# Patient Record
Sex: FEMALE | Race: WHITE | Employment: OTHER | URBAN - METROPOLITAN AREA
[De-identification: names, ages, dates, MRNs, and addresses within clinical notes are randomized per-mention and may not be internally consistent; named-entity substitution may affect disease eponyms.]

---

## 2018-10-11 ENCOUNTER — HOSPITAL ENCOUNTER (OUTPATIENT)
Dept: CARDIOLOGY | Age: 83
Discharge: HOME | End: 2018-10-11
Payer: MEDICARE

## 2018-10-11 ENCOUNTER — OFFICE VISIT (OUTPATIENT)
Dept: VASCULAR SURGERY | Age: 83
End: 2018-10-11
Payer: MEDICARE

## 2018-10-11 DIAGNOSIS — I73.9 PVD (PERIPHERAL VASCULAR DISEASE) WITH CLAUDICATION (HCC): Primary | ICD-10-CM

## 2018-10-11 PROCEDURE — 99213 OFFICE O/P EST LOW 20 MIN: CPT | Performed by: SURGERY

## 2018-10-11 PROCEDURE — 93923 UPR/LXTR ART STDY 3+ LVLS: CPT

## 2018-10-11 RX ORDER — LOSARTAN POTASSIUM 100 MG/1
100 TABLET ORAL DAILY
COMMUNITY

## 2018-10-11 RX ORDER — TORSEMIDE 20 MG/1
20 TABLET ORAL DAILY
Refills: 1 | COMMUNITY
Start: 2018-09-25

## 2018-10-11 RX ORDER — POTASSIUM CHLORIDE 750 MG/1
10 TABLET, EXTENDED RELEASE ORAL DAILY
COMMUNITY

## 2018-10-11 RX ORDER — LOPERAMIDE HYDROCHLORIDE 2 MG/1
2 CAPSULE ORAL 4 TIMES DAILY PRN
COMMUNITY

## 2018-10-11 RX ORDER — LORATADINE 10 MG/1
10 TABLET ORAL DAILY
COMMUNITY

## 2019-04-11 ENCOUNTER — APPOINTMENT (OUTPATIENT)
Dept: MRI IMAGING | Age: 84
End: 2019-04-11
Payer: MEDICARE

## 2019-04-11 ENCOUNTER — HOSPITAL ENCOUNTER (OUTPATIENT)
Age: 84
Discharge: HOME | End: 2019-04-11
Payer: MEDICARE

## 2019-04-11 ENCOUNTER — HOSPITAL ENCOUNTER (EMERGENCY)
Age: 84
Discharge: ANOTHER HOSPITAL | End: 2019-04-11
Attending: EMERGENCY MEDICINE
Payer: MEDICARE

## 2019-04-11 ENCOUNTER — HOSPITAL ENCOUNTER (INPATIENT)
Age: 84
LOS: 1 days | Discharge: OTHER INSTITUTION | DRG: 093 | End: 2019-04-12
Attending: FAMILY MEDICINE | Admitting: FAMILY MEDICINE
Payer: MEDICARE

## 2019-04-11 VITALS
WEIGHT: 140 LBS | DIASTOLIC BLOOD PRESSURE: 86 MMHG | OXYGEN SATURATION: 97 % | BODY MASS INDEX: 22.5 KG/M2 | TEMPERATURE: 98.2 F | HEIGHT: 66 IN | HEART RATE: 78 BPM | SYSTOLIC BLOOD PRESSURE: 141 MMHG | RESPIRATION RATE: 18 BRPM

## 2019-04-11 DIAGNOSIS — I67.1 BRAIN ANEURYSM: Primary | ICD-10-CM

## 2019-04-11 PROBLEM — I72.9 ANEURYSM (HCC): Status: ACTIVE | Noted: 2019-04-11

## 2019-04-11 LAB
ALBUMIN SERPL-MCNC: 4 G/DL (ref 3.5–5.2)
ALP BLD-CCNC: 86 U/L (ref 35–104)
ALT SERPL-CCNC: 13 U/L (ref 0–32)
ANION GAP SERPL CALCULATED.3IONS-SCNC: 10 MMOL/L (ref 7–16)
AST SERPL-CCNC: 19 U/L (ref 0–31)
BASOPHILS ABSOLUTE: 0.08 E9/L (ref 0–0.2)
BASOPHILS RELATIVE PERCENT: 1.1 % (ref 0–2)
BILIRUB SERPL-MCNC: 0.8 MG/DL (ref 0–1.2)
BUN BLDV-MCNC: 23 MG/DL (ref 8–23)
CALCIUM SERPL-MCNC: 9.5 MG/DL (ref 8.6–10.2)
CHLORIDE BLD-SCNC: 99 MMOL/L (ref 98–107)
CO2: 29 MMOL/L (ref 22–29)
CREAT SERPL-MCNC: 0.8 MG/DL (ref 0.5–1)
EOSINOPHILS ABSOLUTE: 0.27 E9/L (ref 0.05–0.5)
EOSINOPHILS RELATIVE PERCENT: 3.7 % (ref 0–6)
GFR AFRICAN AMERICAN: >60
GFR NON-AFRICAN AMERICAN: >60 ML/MIN/1.73
GLUCOSE BLD-MCNC: 95 MG/DL (ref 74–99)
HCT VFR BLD CALC: 43.7 % (ref 34–48)
HEMOGLOBIN: 14.8 G/DL (ref 11.5–15.5)
IMMATURE GRANULOCYTES #: 0.06 E9/L
IMMATURE GRANULOCYTES %: 0.8 % (ref 0–5)
LYMPHOCYTES ABSOLUTE: 1.88 E9/L (ref 1.5–4)
LYMPHOCYTES RELATIVE PERCENT: 25.6 % (ref 20–42)
MCH RBC QN AUTO: 32.3 PG (ref 26–35)
MCHC RBC AUTO-ENTMCNC: 33.9 % (ref 32–34.5)
MCV RBC AUTO: 95.4 FL (ref 80–99.9)
MONOCYTES ABSOLUTE: 0.65 E9/L (ref 0.1–0.95)
MONOCYTES RELATIVE PERCENT: 8.9 % (ref 2–12)
NEUTROPHILS ABSOLUTE: 4.39 E9/L (ref 1.8–7.3)
NEUTROPHILS RELATIVE PERCENT: 59.9 % (ref 43–80)
PDW BLD-RTO: 16.2 FL (ref 11.5–15)
PLATELET # BLD: 230 E9/L (ref 130–450)
PMV BLD AUTO: 9.8 FL (ref 7–12)
POTASSIUM SERPL-SCNC: 3.9 MMOL/L (ref 3.5–5)
RBC # BLD: 4.58 E12/L (ref 3.5–5.5)
SODIUM BLD-SCNC: 138 MMOL/L (ref 132–146)
TOTAL PROTEIN: 6.3 G/DL (ref 6.4–8.3)
WBC # BLD: 7.3 E9/L (ref 4.5–11.5)

## 2019-04-11 PROCEDURE — 70544 MR ANGIOGRAPHY HEAD W/O DYE: CPT

## 2019-04-11 PROCEDURE — 85025 COMPLETE CBC W/AUTO DIFF WBC: CPT

## 2019-04-11 PROCEDURE — A0425 GROUND MILEAGE: HCPCS

## 2019-04-11 PROCEDURE — 70551 MRI BRAIN STEM W/O DYE: CPT

## 2019-04-11 PROCEDURE — 1200000000 HC SEMI PRIVATE

## 2019-04-11 PROCEDURE — 99285 EMERGENCY DEPT VISIT HI MDM: CPT

## 2019-04-11 PROCEDURE — G0378 HOSPITAL OBSERVATION PER HR: HCPCS

## 2019-04-11 PROCEDURE — 80053 COMPREHEN METABOLIC PANEL: CPT

## 2019-04-11 PROCEDURE — A0428 BLS: HCPCS

## 2019-04-11 NOTE — ED PROVIDER NOTES
Department of Emergency Medicine   ED  Provider Note  Admit Date/RoomTime: 4/11/2019  4:23 PM  ED Room: [place] [address]          History of Present Illness:  4/11/19, Time: 6:02 PM         Milana Abreu is a 80 y.o. female presenting to the ED for change in vision, beginning a few days ago. The complaint has been persistent, moderate in severity, and worsened by nothing. Patient comes to the ED from her eye doctor by EMS. EMS reports that pt's eye doctor saw that pt may have a cranial nerve palsy. Sent to the ED for possible CVA. Patient family reported that pt was complaining of double vision and could not focus with her right eye for a few days. States that pt does not walk much due to chronic trouble. Pt states that she has been bumping into things. Reports that they made pt an appointment with her eye doctor, which was today. Report that pt has a hx of Alzheimer, CHF, dementia, hypertension, and PVD. Unable to obtain a complete HPI due to this patient's dementia. Lives in an assistant living facility. Review of Systems:   Unable to obtain a complete ROS due to the patients dementia.       --------------------------------------------- PAST HISTORY ---------------------------------------------  Past Medical History:  has a past medical history of Alzheimer disease, Baker's cyst, Cancer ([facility] CHF (congestive heart failure) ([facility] Dementia, Fracture, toe, Hypertension, and PVD [facility]peripheral vascular disease) [facility] Past Surgical History:  has a past surgical history that includes Total hip arthroplasty; joint replacement; Colon surgery; Total knee arthroplasty; and Colon surgery. Social History:  reports that she quit smoking about a year ago. Her smoking use included cigarettes. She smoked 1.00 pack per day. She has never used smokeless tobacco. She reports that she does not drink alcohol or use drugs. Family History: family history is not on file.      The patients home medications have been reviewed. Allergies: Patient has no known allergies. ---------------------------------------------------PHYSICAL EXAM--------------------------------------      Constitutional/General: Alert and oriented x2, elderly. 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 Mouth: Oropharynx clear, handling secretions  Neck: Supple, full ROM  Respiratory: Lungs clear to auscultation bilaterally, no wheezes, rales, or rhonchi. Not in respiratory distress  Cardiovascular:  Regular rate. Regular rhythm. No murmurs, gallops, or rubs. 2+ distal pulses  Chest: No chest wall tenderness  GI:  Abdomen Soft, Non tender, Non distended. Musculoskeletal: Moves all extremities x 4. Integument: skin warm and dry. Neurologic: Patient is alert. Full neurological exam limited by hx of dementia. NIH is 1. NIH Stroke Scale/Score at time of initial evaluation:  1A: Level of Consciousness 0 - alert; keenly responsive   1B: Ask Month and Age 0 - answers both questions correctly   1C:  Tell Patient To Open and Close Eyes, then Hand  Squeeze 0 - performs both tasks correctly   2: Test Horizontal Extraocular Movements 1 - partial gaze palsy   3: Test Visual Fields 0 - no visual loss   4: Test Facial Palsy 0 - normal symmetric movement   5A: Test Left Arm Motor Drift 0 - no drift, limb holds 90 (or 45) degrees for full 10 seconds   5B: Test Right Arm Motor Drift 0 - no drift, limb holds 90 (or 45) degrees for full 10 seconds   6A: Test Left Leg Motor Drift 0 - no drift; leg holds 30 degree position for full 5 seconds   6B: Test Right Leg Motor Drift 0 - no drift; leg holds 30 degree position for full 5 seconds   7: Test Limb Ataxia   (FNF/Heel-Shin) 0 - absent   8: Test Sensation 0 - normal; no sensory loss   9: Test Language/Aphasia 0 - no aphasia, normal   10: Test Dysarthria 0 - normal   11: Test Extinction/Inattention 0 - no abnormality   Total Score: 1 re-evaluation prior to disposition  This patient has remained hemodynamically stable during their ED course. Re-Evaluations:           Time 6:43 PM  Patient is stable. Upon re-examination, patients symptoms show no change. Patient is resting and stable. Time 7:21 PM  Patient is stable. Discussed results with family and patient. Upon re-examination, patient is resting, NAD. The emergency provider has shared the specific reasons for transfer and admission, and has shared results of today's workup. The patient and family are agreeable for transfer and admission. Consultations:             Time: 7:47 PM.   Spoke with Dr. Giselle Baker (Neurosurgery). Discussed case. They will provide consultation and wants her to go to a general floor. Time: 8:08 PM.   Spoke with Dr. Meenu Anthony (Medicine). Discussed case. They will admit this patient and will provide consultation. Counseling: The emergency provider has spoken with the patient and family members and discussed todays results, in addition to providing specific details for the plan of care and counseling regarding the diagnosis and prognosis. Questions are answered at this time and they are agreeable with the plan.       --------------------------------- IMPRESSION AND DISPOSITION ---------------------------------    IMPRESSION  1. Brain aneurysm Worsening       DISPOSITION  Disposition: Transfer to [address] [place] [address]   Patient condition is stable    SCRIBE ATTESTATION  4/11/19, 6:02 PM.    Portions of this note were prepared by Chepe Dawson acting as Scribe for Selene Kevin MD.    Selene Kevin MD:  The scribe's documentation has been prepared under my direction and personally reviewed by me in its entirety. I confirm that the note above accurately reflects all work, treatment, procedures, and medical decision making performed by me.              Selene Kevin MD  04/16/19 0118

## 2019-04-11 NOTE — ED NOTES
Bed:  HALL-08  Expected date:   Expected time:   Means of arrival:   Comments:     Marquise Nicolas RN  04/11/19 2141

## 2019-04-11 NOTE — ED NOTES
[address] called and records requested since pt came from eye drs.  Facility stated that they will fax tem over      Shahid Cordero RN  04/11/19 2001

## 2019-04-11 NOTE — PROGRESS NOTES
Dr. Richie Wills requested for patient to be transferred to [facility] the Access center @ 1928pm.  Patient Diagnosis: Brain Aneurysm. Dr. Richie Wills spoke with dr. Leigh Ann Peterson (Neurosurgery) @ 1946pm.  Called the Access center @ 1955pm for follow up on call. Will re page Dr. Dangelo Vela.

## 2019-04-12 ENCOUNTER — APPOINTMENT (OUTPATIENT)
Dept: CT IMAGING | Age: 84
DRG: 093 | End: 2019-04-12
Attending: FAMILY MEDICINE
Payer: MEDICARE

## 2019-04-12 VITALS
OXYGEN SATURATION: 96 % | TEMPERATURE: 96.3 F | WEIGHT: 150 LBS | HEIGHT: 69 IN | DIASTOLIC BLOOD PRESSURE: 88 MMHG | HEART RATE: 85 BPM | BODY MASS INDEX: 22.22 KG/M2 | RESPIRATION RATE: 16 BRPM | SYSTOLIC BLOOD PRESSURE: 131 MMHG

## 2019-04-12 PROCEDURE — 2580000003 HC RX 258: Performed by: RADIOLOGY

## 2019-04-12 PROCEDURE — G0378 HOSPITAL OBSERVATION PER HR: HCPCS

## 2019-04-12 PROCEDURE — 70496 CT ANGIOGRAPHY HEAD: CPT

## 2019-04-12 PROCEDURE — 6370000000 HC RX 637 (ALT 250 FOR IP): Performed by: FAMILY MEDICINE

## 2019-04-12 PROCEDURE — 99222 1ST HOSP IP/OBS MODERATE 55: CPT | Performed by: NEUROLOGICAL SURGERY

## 2019-04-12 PROCEDURE — 6360000004 HC RX CONTRAST MEDICATION: Performed by: RADIOLOGY

## 2019-04-12 PROCEDURE — 99223 1ST HOSP IP/OBS HIGH 75: CPT | Performed by: PSYCHIATRY & NEUROLOGY

## 2019-04-12 RX ORDER — MEMANTINE HYDROCHLORIDE 5 MG/1
5 TABLET ORAL 2 TIMES DAILY
Status: DISCONTINUED | OUTPATIENT
Start: 2019-04-12 | End: 2019-04-12 | Stop reason: HOSPADM

## 2019-04-12 RX ORDER — CHOLECALCIFEROL (VITAMIN D3) 50 MCG
2000 TABLET ORAL DAILY
Status: DISCONTINUED | OUTPATIENT
Start: 2019-04-12 | End: 2019-04-12 | Stop reason: HOSPADM

## 2019-04-12 RX ORDER — DONEPEZIL HYDROCHLORIDE 5 MG/1
5 TABLET, FILM COATED ORAL NIGHTLY
Status: DISCONTINUED | OUTPATIENT
Start: 2019-04-12 | End: 2019-04-12 | Stop reason: HOSPADM

## 2019-04-12 RX ORDER — TORSEMIDE 20 MG/1
20 TABLET ORAL DAILY
Status: DISCONTINUED | OUTPATIENT
Start: 2019-04-12 | End: 2019-04-12 | Stop reason: HOSPADM

## 2019-04-12 RX ORDER — SODIUM CHLORIDE 0.9 % (FLUSH) 0.9 %
10 SYRINGE (ML) INJECTION ONCE
Status: COMPLETED | OUTPATIENT
Start: 2019-04-12 | End: 2019-04-12

## 2019-04-12 RX ORDER — ASPIRIN 81 MG/1
81 TABLET ORAL DAILY
Status: DISCONTINUED | OUTPATIENT
Start: 2019-04-12 | End: 2019-04-12 | Stop reason: HOSPADM

## 2019-04-12 RX ORDER — LOSARTAN POTASSIUM AND HYDROCHLOROTHIAZIDE 12.5; 5 MG/1; MG/1
1 TABLET ORAL DAILY
Status: DISCONTINUED | OUTPATIENT
Start: 2019-04-12 | End: 2019-04-12 | Stop reason: HOSPADM

## 2019-04-12 RX ORDER — LOSARTAN POTASSIUM 50 MG/1
100 TABLET ORAL DAILY
Status: DISCONTINUED | OUTPATIENT
Start: 2019-04-12 | End: 2019-04-12 | Stop reason: HOSPADM

## 2019-04-12 RX ORDER — POTASSIUM CHLORIDE 750 MG/1
10 TABLET, EXTENDED RELEASE ORAL DAILY
Status: DISCONTINUED | OUTPATIENT
Start: 2019-04-12 | End: 2019-04-12 | Stop reason: HOSPADM

## 2019-04-12 RX ORDER — OXYCODONE HYDROCHLORIDE AND ACETAMINOPHEN 5; 325 MG/1; MG/1
1 TABLET ORAL EVERY 4 HOURS PRN
Status: DISCONTINUED | OUTPATIENT
Start: 2019-04-12 | End: 2019-04-12 | Stop reason: HOSPADM

## 2019-04-12 RX ADMIN — MEMANTINE HYDROCHLORIDE 5 MG: 5 TABLET, FILM COATED ORAL at 09:06

## 2019-04-12 RX ADMIN — Medication 2000 UNITS: at 09:06

## 2019-04-12 RX ADMIN — TORSEMIDE 20 MG: 20 TABLET ORAL at 09:06

## 2019-04-12 RX ADMIN — OXYCODONE HYDROCHLORIDE AND ACETAMINOPHEN 1 TABLET: 5; 325 TABLET ORAL at 05:10

## 2019-04-12 RX ADMIN — Medication 10 ML: at 18:24

## 2019-04-12 RX ADMIN — ASPIRIN 81 MG: 81 TABLET ORAL at 09:06

## 2019-04-12 RX ADMIN — POTASSIUM CHLORIDE 10 MEQ: 10 TABLET, EXTENDED RELEASE ORAL at 09:57

## 2019-04-12 RX ADMIN — IOPAMIDOL 60 ML: 755 INJECTION, SOLUTION INTRAVENOUS at 18:24

## 2019-04-12 ASSESSMENT — PAIN SCALES - GENERAL: PAINLEVEL_OUTOF10: 7

## 2019-04-12 ASSESSMENT — ENCOUNTER SYMPTOMS
BACK PAIN: 0
PHOTOPHOBIA: 0
EYE PAIN: 0
SHORTNESS OF BREATH: 0
NAUSEA: 0
WHEEZING: 0
DIARRHEA: 0
TROUBLE SWALLOWING: 0
ABDOMINAL PAIN: 0
EYE DISCHARGE: 0
COUGH: 0
CONSTIPATION: 0
VOMITING: 0

## 2019-04-12 ASSESSMENT — VISUAL ACUITY: VA_NORMAL: 1

## 2019-04-12 NOTE — PLAN OF CARE
Problem: Falls - Risk of:  Goal: Will remain free from falls  Description  Will remain free from falls  Outcome: Met This Shift

## 2019-04-12 NOTE — CONSULTS
attempt to quit: 2018     Years since quittin.0    Smokeless tobacco: Never Used   Substance and Sexual Activity    Alcohol use: No    Drug use: No    Sexual activity: Not on file   Lifestyle    Physical activity:     Days per week: Not on file     Minutes per session: Not on file    Stress: Not on file   Relationships    Social connections:     Talks on phone: Not on file     Gets together: Not on file     Attends Scientologist service: Not on file     Active member of club or organization: Not on file     Attends meetings of clubs or organizations: Not on file     Relationship status: Not on file    Intimate partner violence:     Fear of current or ex partner: Not on file     Emotionally abused: Not on file     Physically abused: Not on file     Forced sexual activity: Not on file   Other Topics Concern    Not on file   Social History Narrative    Not on file       Medications:   Current Facility-Administered Medications   Medication Dose Route Frequency Provider Last Rate Last Dose    aspirin EC tablet 81 mg  81 mg Oral Daily Kaushik Arndt MD   81 mg at 19 9873    vitamin D tablet 2,000 Units  2,000 Units Oral Daily Kaushik Arndt MD   2,000 Units at 19 0906    losartan (COZAAR) tablet 100 mg  100 mg Oral Daily Kaushik Arndt MD   Stopped at 19 0909    magnesium hydroxide (MILK OF MAGNESIA) 400 MG/5ML suspension 5 mL  5 mL Oral Daily PRN Kaushik Arndt MD        memantine [facility]) tablet 5 mg  5 mg Oral BID Kaushik Arndt MD   5 mg at 19 0414    donepezil (ARICEPT) tablet 5 mg  5 mg Oral Nightly Kaushik Arndt MD        losartan-hydrochlorothiazide [facility]) 50-12.5 MG per tablet 1 tablet  1 tablet Oral Daily Kaushik Arndt MD   Stopped at 19 0909    oxyCODONE-acetaminophen (PERCOCET) 5-325 MG per tablet 1 tablet  1 tablet Oral Q4H PRN Kaushik Arndt MD   1 tablet at 19 0510    potassium chloride (KLOR-CON M) extended release tablet 10 mEq  10 mEq Oral Daily Leonides Hernandez MD   10 mEq at 04/12/19 0957    torsemide (DEMADEX) tablet 20 mg  20 mg Oral Daily Leonides Hernandez MD   20 mg at 04/12/19 0842        Allergies:    Patient has no known allergies. Review of Systems   Constitutional: Negative for fever. HENT: Negative for trouble swallowing. Eyes: Negative for photophobia, pain and visual disturbance. Respiratory: Negative for shortness of breath. Cardiovascular: Negative for chest pain. Gastrointestinal: Negative for abdominal pain. Endocrine: Negative for heat intolerance. Genitourinary: Negative for flank pain. Musculoskeletal: Negative for back pain, myalgias and neck pain. Skin: Negative for wound. Neurological: Negative for dizziness, facial asymmetry, weakness, numbness and headaches. Physical Exam   Constitutional: She appears well-developed and well-nourished. Confused at baseline, hx Alzheimer's    HENT:   Head: Normocephalic and atraumatic. Eyes: Conjunctivae are normal.   Pupils 1mm bilaterally, reactive  No ptosis  Unable to adduct right eye medially   Peripheral vision intact   Neck: Normal range of motion. No tracheal deviation present. Cardiovascular: Normal rate. Pulmonary/Chest: Effort normal.   Abdominal: She exhibits no distension. Musculoskeletal: Normal range of motion. Neurological:   Alert and oriented to person and place, not to year  Motor strength full  Sensation intact to light touch     Skin: Skin is warm and dry. 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    -She has incidental left ICA

## 2019-04-12 NOTE — ED NOTES
Pt arrived to our facility with a pre-existing skin tear on left hand.       Analy Suarez RN  04/11/19 2036

## 2019-04-12 NOTE — CONSULTS
motion. Left eye exhibits abnormal extraocular motion and nystagmus. Pupils are pinpoint and non-reactive    Neck: Normal range of motion. Neck supple. Cardiovascular: Normal rate, regular rhythm, normal heart sounds and intact distal pulses. Pulmonary/Chest: Effort normal and breath sounds normal.   Abdominal: Soft. Bowel sounds are normal. She exhibits no distension. There is no tenderness. Musculoskeletal: Normal range of motion. She exhibits no edema, tenderness or deformity. Neurological: She has normal strength. Reflex Scores:       Tricep reflexes are 2+ on the right side and 2+ on the left side. Bicep reflexes are 2+ on the right side and 2+ on the left side. Brachioradialis reflexes are 2+ on the right side and 2+ on the left side. Patellar reflexes are 2+ on the right side and 3+ on the left side. Skin: Skin is warm and dry. Capillary refill takes less than 2 seconds. Psychiatric: She has a normal mood and affect. Her speech is normal.           Neurological Exam  Mental Status  Awake. Oriented only to person, place and time. Recalls 0 of 3 objects immediately. Speech is normal. Language is fluent with no aphasia. Unable to perform serial calculations. Difficulty spelling words backwards. Fund of knowledge is appropriate for level of education. Cranial Nerves  CN II: Vision test: Pupils are pinpoint and non-reactive . Visual acuity is normal. Right normal visual field. Left normal visual field. CN III, IV, VI: Present in left eye. Normal lids and orbits bilaterally. CN V: Facial sensation is normal.  CN VII: Full and symmetric facial movement. CN VIII: Hearing is normal.  CN XI: Shoulder shrug strength is normal.  CN XII: Tongue midline without atrophy or fasciculations. Motor  Normal muscle bulk throughout. Normal muscle tone. No abnormal involuntary movements. Strength is 5/5 throughout all four extremities. Sensory  Sensation is grossly intact.     Reflexes Right                      Left  Brachioradialis                    2+                         2+  Biceps                                 2+                         2+  Triceps                                2+                         2+  Patellar                                2+                         3+  Plantar                           Downgoing                Downgoing    Coordination  Right: Finger-to-nose normal. Rapid alternating movement normal.  Left: Finger-to-nose normal. Rapid alternating movement normal.    Gait  Uses walker . Laboratory/Radiology:     CBC with Differential:    Lab Results   Component Value Date    WBC 7.3 04/11/2019    RBC 4.58 04/11/2019    HGB 14.8 04/11/2019    HCT 43.7 04/11/2019     04/11/2019    MCV 95.4 04/11/2019    MCH 32.3 04/11/2019    MCHC 33.9 04/11/2019    RDW 16.2 04/11/2019    LYMPHOPCT 25.6 04/11/2019    MONOPCT 8.9 04/11/2019    BASOPCT 1.1 04/11/2019    MONOSABS 0.65 04/11/2019    LYMPHSABS 1.88 04/11/2019    EOSABS 0.27 04/11/2019    BASOSABS 0.08 04/11/2019     BMP:    Lab Results   Component Value Date     04/11/2019    K 3.9 04/11/2019    CL 99 04/11/2019    CO2 29 04/11/2019    BUN 23 04/11/2019    LABALBU 4.0 04/11/2019    CREATININE 0.8 04/11/2019    CALCIUM 9.5 04/11/2019    GFRAA >60 04/11/2019    LABGLOM >60 04/11/2019    GLUCOSE 95 04/11/2019       MRA Head WO contrast: 2.5 mm in left internal carotid artery. 1 mm aneurysm in proximal left anterior cerebral artery    MRI brain:Extensive white matter changes in right and left cerebral hemispheres. No acute insult noted      I independently reviewed the labs and imaging studies at today's appointment. 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 No further imaging necessary. -ICA aneurysm   -Dementia  -Hypertension  -Osteoarthritis       Plan:     1.  Conservative medical management recommended. Continue aspirin, control blood pressure. May benefit from eye patches. Recommend follow-up with Ophthalmologist.  2. Unrelated to current presentation  3.  Percocet responsible for pinpoint pupils      Jelani Cobb MD  PGY-2 Internal Medicine  2:17 PM  4/12/2019

## 2019-04-12 NOTE — PROGRESS NOTES
Physical Therapy    Date: 2019       Patient Name: Rossy Heard  : 1929      MRN: [id number]    PT order received. Chart has been reviewed. PT evaluation will be on hold due to awaiting NS POC. Will continue to follow and complete evaluation at later time.      Aida Bobo, PT

## 2019-04-12 NOTE — CARE COORDINATION
I went in to see the patient and she is from [facility] and per the liaison Juan Alberto Sage the patient can return there  today and has no  needs. The family will transport .  I will follow thanks Dedrick-Preston

## 2019-04-12 NOTE — DISCHARGE INSTR - COC
Continuity of Care Form    Patient Name: Grisel Tan   :  1929  MRN:  [id number]    Admit date:  2019  Discharge date:  2019    Code Status Order: Prior   Advance Directives:   [facility] Documentation     Date/Time Healthcare Directive Type of Healthcare Directive Copy in [address] Agent's Name Healthcare Agent's Phone Number    [phone]  Yes, patient has an advance directive for healthcare treatment  Durable power of  for health care;Living will  No, copy requested from family  --  --  --          Admitting Physician:  Zohra Hurst MD  PCP: Shaheed Godinez MD    Discharging Nurse: Virginia Casillas Unit/Room#: 6734/4101-C  Discharging Unit Phone Number: [phone]    Emergency Contact:   Extended Emergency Contact Information  Primary Emergency Contact: Lj Glover  Address: [address] [place] [address] Phone: [phone]  Relation: Spouse  Secondary Emergency Contact: Hernesto Nettles [address] Phone: [phone]  Relation: Child    Past Surgical History:  Past Surgical History:   Procedure Laterality Date    COLON SURGERY      COLON SURGERY      JOINT REPLACEMENT      right    TOTAL HIP ARTHROPLASTY      TOTAL KNEE ARTHROPLASTY         Immunization History: There is no immunization history for the selected administration types on file for this patient.     Active Problems:  Patient Active Problem List   Diagnosis Code    PVD (peripheral vascular disease) with claudication ([facility]) I73.9    Aneurysm ([facility] I72.9       Isolation/Infection:   Isolation          No Isolation            Nurse Assessment:  Last Vital Signs: /61   Pulse 80   Temp 97.8 °F (36.6 °C) (Oral)   Resp 16   Ht 5' 9\" (1.753 m)   Wt 150 lb (68 kg)   SpO2 97%   BMI 22.15 kg/m²     Last documented pain score (0-10 scale): Pain Level: 7  Last Weight:   Wt Readings from Last 1 Encounters:   04/11/19 150 lb (68 kg)     Mental Status:  disoriented and alert    IV Access:  - None    Nursing Mobility/ADLs:  Walking   Assisted  Transfer  Assisted  Bathing  Assisted  Dressing  Assisted  Toileting  Assisted  Feeding  Assisted  Med Admin  Assisted  Med Delivery   whole    Wound Care Documentation and Therapy:        Elimination:  Continence:   · Bowel: Yes  · Bladder: Yes  Urinary Catheter: None   Colostomy/Ileostomy/Ileal Conduit: No       Date of Last BM: 04/11/19    Intake/Output Summary (Last 24 hours) at 4/12/2019 1327  Last data filed at 4/12/2019 0745  Gross per 24 hour   Intake 360 ml   Output --   Net 360 ml     No intake/output data recorded. Safety Concerns: At Risk for Falls    Impairments/Disabilities:      Vision    Nutrition Therapy:  Current Nutrition Therapy:   - Oral Diet:  General    Routes of Feeding: Oral  Liquids: No Restrictions  Daily Fluid Restriction: no  Last Modified Barium Swallow with Video (Video Swallowing Test): not done    Treatments at the Time of Hospital Discharge:   Respiratory Treatments: N/A  Oxygen Therapy:  is not on home oxygen therapy.   Ventilator:    - No ventilator support    Rehab Therapies: Physical Therapy, Occupational Therapy  Weight Bearing Status/Restrictions: No weight bearing restirctions  Other Medical Equipment (for information only, NOT a DME order):  walker  Other Treatments: N/A    Patient's personal belongings (please select all that are sent with patient):  Cheo    RN SIGNATURE:  Electronically signed by Kp Raygoza RN on 4/12/19 at 4:54 PM    CASE MANAGEMENT/SOCIAL WORK SECTION    Inpatient Status Date: ***    Readmission Risk Assessment Score:  Readmission Risk              Risk of Unplanned Readmission:        8           Discharging to Facility/ Agency   · Name:   · Address:  · Phone:  · Fax:    Dialysis Facility (if applicable)   · Name:  · Address:  · Dialysis Schedule:  · Phone:  · Fax:    Case Manager/ signature: {Esignature:[id number]}    PHYSICIAN SECTION    Prognosis: {Prognosis:6262308640}    Condition at Discharge: [address] Patient Condition:988996631}    Rehab Potential (if transferring to Rehab): {Prognosis:9120328857}    Recommended Labs or Other Treatments After Discharge: ***    Physician Certification: I certify the above information and transfer of Mayra Kinsey  is necessary for the continuing treatment of the diagnosis listed and that she requires {Admit to Appropriate Level of Care:61811} for {GREATER/LESS:608588363} 30 days.      Update Admission H&P: {CHP DME Changes in AKQBB:524728758}    PHYSICIAN SIGNATURE:  Dinorah Boss MD

## 2019-04-13 NOTE — DISCHARGE SUMMARY
Physician Discharge Summary     Patient ID:  My Ramirez  [id number]  25 y.o.  7/4/1929    Admit date: 4/11/2019    Discharge date and time: 4/12/2019  6:56 PM     Admitting Physician: Jersey Benjamin MD     Discharge Physician: Azul Abebe MD    Admission Diagnoses: Aneurysm [facility]) [I72.9]    Discharge Diagnoses: Left ICA aneurysm    Admission Condition: fair    Discharged Condition: stable    Indication for Admission: as above    Hospital Course: Patient evaluated for new left ICA aneurysm per Neurosurgery. Patient and family declined against further treatment. Discharged in stable condition. Consults: Neurosurgery    Significant Diagnostic Studies: MRI brain    Treatments: none    Discharge Exam:  See todays note    Disposition: home    In process/preliminary results:  Outstanding Order Results     Date and Time Order Name Status Description    4/11/2019 2036 EKG 12 Lead Preliminary           Patient Instructions:   Discharge Medication List as of 4/12/2019  4:55 PM      CONTINUE these medications which have NOT CHANGED    Details   torsemide (DEMADEX) 20 MG tablet Take 20 mg by mouth daily, R-1Historical Med      potassium chloride (KLOR-CON M) 10 MEQ extended release tablet Take 10 mEq by mouth dailyHistorical Med      losartan (COZAAR) 100 MG tablet Take 100 mg by mouth dailyHistorical Med      Metoprolol Succinate 50 MG CS24 Take by mouthHistorical Med      magnesium hydroxide (MILK OF MAGNESIA) 400 MG/5ML suspension Take by mouth daily as needed for ConstipationHistorical Med      loperamide (IMODIUM) 2 MG capsule Take 2 mg by mouth 4 times daily as needed for DiarrheaHistorical Med      Cholecalciferol (VITAMIN D3) 2000 UNITS CAPS Take by mouth      Donepezil HCl (ARICEPT PO) Take  by mouth. oxyCODONE-acetaminophen (PERCOCET) 5-325 MG per tablet Take 1 tablet by mouth every 8 hours as needed for Pain for 6 doses. , Disp-6 tablet, R-0      Losartan Potassium-HCTZ (HYZAAR PO) Take  by mouth.        Memantine HCl (NAMENDA PO) Take  by mouth. Multiple Vitamins-Minerals (CENTRUM SILVER PO) Take  by mouth. aspirin 81 MG EC tablet Take 81 mg by mouth daily.         loratadine (CLARITIN) 10 MG tablet Take 10 mg by mouth dailyHistorical Med           Activity: activity as tolerated  Diet: cardiac diet  Wound Care: none needed    Follow-up with PCP in 1-2 weeks    Signed:  Pearl Manuel MD  4/13/2019  6:17 AM

## 2019-04-17 LAB
EKG ATRIAL RATE: 94 BPM
EKG Q-T INTERVAL: 362 MS
EKG QRS DURATION: 100 MS
EKG QTC CALCULATION (BAZETT): 466 MS
EKG R AXIS: -32 DEGREES
EKG T AXIS: 122 DEGREES
EKG VENTRICULAR RATE: 100 BPM

## 2019-10-16 DIAGNOSIS — I73.9 PVD (PERIPHERAL VASCULAR DISEASE) WITH CLAUDICATION (HCC): Primary | ICD-10-CM

## 2019-10-17 ENCOUNTER — HOSPITAL ENCOUNTER (OUTPATIENT)
Dept: CARDIOLOGY | Age: 84
Discharge: HOME | End: 2019-10-17
Payer: MEDICARE

## 2019-10-17 ENCOUNTER — OFFICE VISIT (OUTPATIENT)
Dept: VASCULAR SURGERY | Age: 84
End: 2019-10-17
Payer: MEDICARE

## 2019-10-17 VITALS
BODY MASS INDEX: 23.63 KG/M2 | DIASTOLIC BLOOD PRESSURE: 73 MMHG | HEART RATE: 70 BPM | SYSTOLIC BLOOD PRESSURE: 140 MMHG | WEIGHT: 160 LBS

## 2019-10-17 DIAGNOSIS — I73.9 PVD (PERIPHERAL VASCULAR DISEASE) WITH CLAUDICATION (HCC): ICD-10-CM

## 2019-10-17 DIAGNOSIS — I73.9 PVD (PERIPHERAL VASCULAR DISEASE) WITH CLAUDICATION (HCC): Primary | ICD-10-CM

## 2019-10-17 PROCEDURE — 93923 UPR/LXTR ART STDY 3+ LVLS: CPT

## 2019-10-17 PROCEDURE — 99213 OFFICE O/P EST LOW 20 MIN: CPT | Performed by: SURGERY

## 2019-10-17 RX ORDER — ACETAMINOPHEN 325 MG/1
TABLET ORAL
COMMUNITY

## 2019-10-17 RX ORDER — ATORVASTATIN CALCIUM 80 MG/1
TABLET, FILM COATED ORAL
COMMUNITY
Start: 2019-07-26

## 2019-10-17 RX ORDER — MAGNESIUM HYDROXIDE/ALUMINUM HYDROXICE/SIMETHICONE 120; 1200; 1200 MG/30ML; MG/30ML; MG/30ML
30 SUSPENSION ORAL EVERY 4 HOURS PRN
COMMUNITY